# Patient Record
Sex: MALE | Race: BLACK OR AFRICAN AMERICAN | Employment: UNEMPLOYED | URBAN - METROPOLITAN AREA
[De-identification: names, ages, dates, MRNs, and addresses within clinical notes are randomized per-mention and may not be internally consistent; named-entity substitution may affect disease eponyms.]

---

## 2018-08-25 ENCOUNTER — HOSPITAL ENCOUNTER (EMERGENCY)
Facility: CLINIC | Age: 33
Discharge: HOME | End: 2018-08-25
Attending: EMERGENCY MEDICINE | Admitting: EMERGENCY MEDICINE
Payer: COMMERCIAL

## 2018-08-25 ENCOUNTER — APPOINTMENT (OUTPATIENT)
Dept: GENERAL RADIOLOGY | Facility: CLINIC | Age: 33
End: 2018-08-25
Attending: EMERGENCY MEDICINE
Payer: COMMERCIAL

## 2018-08-25 VITALS
SYSTOLIC BLOOD PRESSURE: 139 MMHG | WEIGHT: 170 LBS | RESPIRATION RATE: 16 BRPM | DIASTOLIC BLOOD PRESSURE: 82 MMHG | BODY MASS INDEX: 26.68 KG/M2 | HEIGHT: 67 IN | TEMPERATURE: 98 F | HEART RATE: 80 BPM | OXYGEN SATURATION: 98 %

## 2018-08-25 DIAGNOSIS — S00.511A ABRASION OF LIP, INITIAL ENCOUNTER: ICD-10-CM

## 2018-08-25 DIAGNOSIS — S40.022A ARM CONTUSION, LEFT, INITIAL ENCOUNTER: ICD-10-CM
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
## 2018-08-25 PROCEDURE — 25000132 ZZH RX MED GY IP 250 OP 250 PS 637: Performed by: EMERGENCY MEDICINE

## 2018-08-25 PROCEDURE — 73030 X-RAY EXAM OF SHOULDER: CPT | Mod: LT

## 2018-08-25 PROCEDURE — 99283 EMERGENCY DEPT VISIT LOW MDM: CPT

## 2018-08-25 RX ORDER — ACETAMINOPHEN 325 MG/1
975 TABLET ORAL ONCE
Status: COMPLETED | OUTPATIENT
Start: 2018-08-25 | End: 2018-08-25

## 2018-08-25 RX ADMIN — ACETAMINOPHEN 975 MG: 325 TABLET, FILM COATED ORAL at 04:59

## 2018-08-25 ASSESSMENT — ENCOUNTER SYMPTOMS
DIZZINESS: 0
BACK PAIN: 0
NUMBNESS: 0
NECK PAIN: 0
WOUND: 1
ABDOMINAL PAIN: 0
SHORTNESS OF BREATH: 0

## 2018-08-25 NOTE — ED TRIAGE NOTES
Pt comes in after falling down 6-7 steps  Were slippery and now c/o left upper arm  Has bloody lip  No loc here for eval

## 2018-08-25 NOTE — LETTER
August 25, 2018      To Whom It May Concern:      Montana Lawton was seen in our Emergency Department today, 08/25/18.  I expect his condition to improve over the next 1-2 days.  He may return to work when improved.    Sincerely,        Jose Au RN

## 2018-08-25 NOTE — ED AVS SNAPSHOT
[facility] Emergency Department    [address]    [facility] [address]    Phone:  [phone]    Fax:  [phone]                                       Montana Lawton   MRN: [id number]    Department:  [facility] Emergency Department   Date of Visit:  8/25/2018           After Visit Summary Signature Page     I have received my discharge instructions, and my questions have been answered. I have discussed any challenges I see with this plan with the nurse or doctor.    ..........................................................................................................................................  Patient/Patient Representative Signature      ..........................................................................................................................................  Patient Representative Print Name and Relationship to Patient    ..................................................               ................................................  Date                                            Time    ..........................................................................................................................................  Reviewed by Signature/Title    ...................................................              ..............................................  Date                                                            Time          22EPIC Rev 08/18

## 2018-08-25 NOTE — DISCHARGE INSTRUCTIONS
You should take Tylenol and ibuprofen as needed for pain in the left arm.  He should ice it for comfort.  He should avoid any activities that make her pain worse, and gradually return to full activities.  He should make an appointment with her primary care doctor and follow-up with them if your pain persists.

## 2018-08-25 NOTE — ED AVS SNAPSHOT
[facility] Emergency Department    [address] [place] [address]    Phone:  [phone]    Fax:  [phone]                                       Montana Lawton   MRN: [id number]    Department:  [facility] Emergency Department   Date of Visit:  8/25/2018           Patient Information     Date Of Birth          1985        Your diagnoses for this visit were:     Abrasion of lip, initial encounter     Arm contusion, left, initial encounter     Fall, initial encounter        You were seen by Devon Bain MD.      Follow-up Information     Schedule an appointment as soon as possible for a visit with [facility].    Why:  Follow-up as needed for continued left arm pain        Follow up with [facility].    Why:  As needed, If symptoms worsen        Discharge Instructions       You should take Tylenol and ibuprofen as needed for pain in the left arm.  He should ice it for comfort.  He should avoid any activities that make her pain worse, and gradually return to full activities.  He should make an appointment with her primary care doctor and follow-up with them if your pain persists.    24 Hour Appointment Hotline       To make an appointment at any [facility] clinic, call [phone] ([phone]). If you don't have a family doctor or clinic, we will help you find one. [facility] clinics are conveniently located to serve the needs of you and your family.             Review of your medicines      Notice     You have not been prescribed any medications.            Procedures and tests performed during your visit     XR Shoulder Left 2 Views      Orders Needing Specimen Collection     None      Pending Results     No orders found from 8/23/2018 to 8/26/2018.            Pending Culture Results     No orders found from 8/23/2018 to 8/26/2018.            Pending Results Instructions     If you had any lab results that were not finalized  at the time of your Discharge, you can call the ED Lab Result RN at [phone]. You will be contacted by this team for any positive Lab results or changes in treatment. The nurses are available 7 days a week from 10A to 6:30P.  You can leave a message 24 hours per day and they will return your call.        Test Results From Your Hospital Stay        8/25/2018  6:24 AM      Narrative     XR SHOULDER 2 VIEW LEFT   8/25/2018 5:39 AM     INDICATION: Fall. Possible dislocation or fracture.    COMPARISON: None.        Impression     IMPRESSION: There is an older, healing fracture deformity of the mid  clavicle. Otherwise negative.    CARROL FRANCO MD                Clinical Quality Measure: Blood Pressure Screening     Your blood pressure was checked while you were in the emergency department today. The last reading we obtained was  BP: 139/82 . Please read the guidelines below about what these numbers mean and what you should do about them.  If your systolic blood pressure (the top number) is less than 120 and your diastolic blood pressure (the bottom number) is less than 80, then your blood pressure is normal. There is nothing more that you need to do about it.  If your systolic blood pressure (the top number) is 120-139 or your diastolic blood pressure (the bottom number) is 80-89, your blood pressure may be higher than it should be. You should have your blood pressure rechecked within a year by a primary care provider.  If your systolic blood pressure (the top number) is 140 or greater or your diastolic blood pressure (the bottom number) is 90 or greater, you may have high blood pressure. High blood pressure is treatable, but if left untreated over time it can put you at risk for heart attack, stroke, or kidney failure. You should have your blood pressure rechecked by a primary care provider within the next 4 weeks.  If your provider in the emergency department today gave you specific instructions to follow-up  "with your doctor or provider even sooner than that, you should follow that instruction and not wait for up to 4 weeks for your follow-up visit.        Thank you for choosing [facility]       Thank you for choosing [facility] for your care. Our goal is always to provide you with excellent care. Hearing back from our patients is one way we can continue to improve our services. Please take a few minutes to complete the written survey that you may receive in the mail after you visit with us. Thank you!        [facility] Information     [facility] lets you send messages to your doctor, view your test results, renew your prescriptions, schedule appointments and more. To sign up, go to [email or web address] . Click on \"Log in\" on the left side of the screen, which will take you to the Welcome page. Then click on \"Sign up Now\" on the right side of the page.     You will be asked to enter the access code listed below, as well as some personal information. Please follow the directions to create your username and password.     Your access code is: [email or web address]  Expires: 2018  6:27 AM     Your access code will  in 90 days. If you need help or a new code, please call your [facility] clinic or [phone].        Care EveryWhere ID     This is your Care EveryWhere ID. This could be used by other organizations to access your [facility] medical records  [id number]        Equal Access to Services     MORGAN SAVAGE : Hadmichelle Bourgeois, [place], qaybta kaalramesh ruiz. So [facility] [phone].    ATENCIÓN: Si habla español, tiene a montaño disposición servicios gratuitos de asistencia lingüística. Mao al [phone].    We comply with applicable federal civil rights laws and [place] laws. We do not discriminate on the basis of race, color, national origin, age, disability, sex, sexual orientation, or gender identity.            After Visit Summary       This is your " record. Keep this with you and show to your community pharmacist(s) and doctor(s) at your next visit.

## 2018-08-25 NOTE — ED PROVIDER NOTES
"  History     Chief Complaint:  Shoulder pain    HPI   Montana Lawton is a 33 year old male who presents with concern for shoulder pain. The patient reports that he was walking down wet steps when he slipped and fell forward down 6 -7 steps. The patient sustained a small laceration to the left of the midline of his upper lip, as well as left shoulder pain secondary to his fall. He notes that his left shoulder pain radiates into the lateral portion of his upper left arm, and states that he is unable to move his arm from a resting position secondary to his pain. Due to his limited range of motion he decided to present to the ER. He denies dizziness, chest pain, or shortness of breath prior to the fall. He also denies any current neck pain, back pain, abdominal pain, numbness or tingling in his hands or fingers, visual disturbance, taking drugs or alcohol, or any other concerns here in the ER today.      Allergies:  NKDA    Medications:    The patient is currently on no regular medications.     Past Medical History:    The patient denies any significant past medical history.    Past Surgical History:    The patient does not have any pertinent past surgical history.    Family History:    No past pertinent family history.    Social History:  Marital Status:    Smoke: every day  Negative for alcohol use.     Review of Systems   Eyes: Negative for visual disturbance.   Respiratory: Negative for shortness of breath.    Cardiovascular: Negative for chest pain.   Gastrointestinal: Negative for abdominal pain.   Musculoskeletal: Negative for back pain and neck pain.        Left shoulder and arm pain     Skin: Positive for wound.   Neurological: Negative for dizziness and numbness.   All other systems reviewed and are negative.        Physical Exam   Patient Vitals for the past 24 hrs:   BP Temp Temp src Pulse Resp SpO2 Height Weight   08/25/18 0428 139/82 98  F (36.7  C) Temporal 88 16 95 % 1.702 m (5' 7\") 77.1 kg (170 lb) "         Physical Exam    Constitutional: Alert, attentive, GCS 15  HENT:    Head: no scalp lacerations or contusions, no periorbital or posterior auricular ecchymosis.    Nose: Nose normal.    Mouth/Throat: Upper lip abrasion at the philtrum, no evidence of through and through lip laceration, no dental subluxation  Neck:  no midline tenderness, ROM full  Eyes: EOM are normal, conjugate gaze, pupils symmetric reactive.   CV: regular rate and rhythm; no murmurs  Chest: Effort normal and breath sounds normal, symmetric bilaterally. No seat belt sign. No crepitus  GI:  Non-tender without guarding or rebound, no seat belt sign  Back: No T or L spine tenderness, no step offs  MSK: No long bone tenderness or deformities.  Muscle compartments soft. Mild proximal left upper arm tenderness with limited passive range of motion in cluding abduction and adduction.   Neurological: Alert, attentive.  and plantar strength 5/5.  Sensation intact to light touch in in distal BLE and BUE.  Neurovascularly intact  Skin: Skin is warm and dry. Abrasion to upper lip above filtrum       Emergency Department Course     Imaging:  Radiographic findings were communicated with the patient who voiced understanding of the findings.    XR Shoulder 3 views, left:   Normal as per radiology.     Laboratory:  Deferred    Interventions:  Medications   acetaminophen (TYLENOL) tablet 975 mg (975 mg Oral Given 8/25/18 2732)       Emergency Department Course:  Nursing notes and vitals reviewed. (0486) I performed an exam of the patient as documented above.     IV inserted. Medicine administered as documented above. Blood drawn. This was sent to the lab for further testing, results above.    The patient was sent for a shoulder x-ray while in the emergency department, findings above.     (9471) I rechecked the patient and discussed the results of his workup thus far.     Findings and plan explained to the Patient. Patient discharged home with instructions  regarding supportive care, medications, and reasons to return. The importance of close follow-up was reviewed.     I personally reviewed the laboratory results with the Patient and answered all related questions prior to discharge.     Impression & Plan      Medical Decision Makin-year-old male with no reported past medical history presenting for evaluation of her mechanical fall down 6-7 steps now reporting left arm pain.  Vital signs within normal limits on arrival.  His exam is not suggestive of fracture or dislocation as he has only significant pain with flexion of his left biceps and resisting this motion more consistent with muscle contusion versus tendon injury, possibly long biceps tendon.  X-ray of the left shoulder was negative for fracture dislocation.  He was incidentally noted to have a healed mid clavicle fracture, he reports he sustained 8 years prior.  He does have a upper lip abrasion without evidence of laceration or through and through lip injury.  No dental subluxation patient was felt safe for discharge without further imaging or lab testing. Plan is for Tylenol, ibuprofen, ice and rest as needed with follow-up at his PCP if his pain persists    Diagnosis:    ICD-10-CM    1. Abrasion of lip, initial encounter S00.511A    2. Arm contusion, left, initial encounter S40.022A    3. Fall, initial encounter W19.XXXA        Disposition:  discharged to home    Discharge Medications:  None    Devon Bain MD   [employer]  6:39 AM 18       Scribe Disclosure:  I, Rodolfo Allison, am serving as a scribe on 2018 at 4:37 AM to personally document services performed by Devon Bain MD based on my observations and the provider's statements to me.       Rodolfo Allison  2018   [facility] EMERGENCY DEPARTMENT       Devon Bain MD  18 0639